# Patient Record
Sex: FEMALE | Race: WHITE | NOT HISPANIC OR LATINO | Employment: UNEMPLOYED | URBAN - METROPOLITAN AREA
[De-identification: names, ages, dates, MRNs, and addresses within clinical notes are randomized per-mention and may not be internally consistent; named-entity substitution may affect disease eponyms.]

---

## 2024-02-21 ENCOUNTER — APPOINTMENT (OUTPATIENT)
Dept: PRIMARY CARE | Facility: CLINIC | Age: 24
End: 2024-02-21
Payer: COMMERCIAL

## 2024-02-21 ENCOUNTER — OFFICE VISIT (OUTPATIENT)
Dept: PRIMARY CARE | Facility: CLINIC | Age: 24
End: 2024-02-21
Payer: COMMERCIAL

## 2024-02-21 VITALS
SYSTOLIC BLOOD PRESSURE: 104 MMHG | HEART RATE: 72 BPM | WEIGHT: 247.4 LBS | HEIGHT: 61 IN | DIASTOLIC BLOOD PRESSURE: 60 MMHG | TEMPERATURE: 97.5 F | OXYGEN SATURATION: 99 % | BODY MASS INDEX: 46.71 KG/M2

## 2024-02-21 DIAGNOSIS — Z00.00 ANNUAL PHYSICAL EXAM: Primary | ICD-10-CM

## 2024-02-21 DIAGNOSIS — K59.04 CHRONIC IDIOPATHIC CONSTIPATION: ICD-10-CM

## 2024-02-21 DIAGNOSIS — R39.15 URGENCY OF URINATION: ICD-10-CM

## 2024-02-21 PROBLEM — E66.01 OBESITY, CLASS III, BMI 40-49.9 (MORBID OBESITY) (MULTI): Status: ACTIVE | Noted: 2019-10-29

## 2024-02-21 PROBLEM — E66.813 OBESITY, CLASS III, BMI 40-49.9 (MORBID OBESITY): Status: ACTIVE | Noted: 2019-10-29

## 2024-02-21 LAB
POC APPEARANCE, URINE: CLEAR
POC BILIRUBIN, URINE: NEGATIVE
POC BLOOD, URINE: NEGATIVE
POC COLOR, URINE: YELLOW
POC GLUCOSE, URINE: NEGATIVE MG/DL
POC KETONES, URINE: NEGATIVE MG/DL
POC LEUKOCYTES, URINE: NEGATIVE
POC NITRITE,URINE: NEGATIVE
POC PH, URINE: 6 PH
POC PROTEIN, URINE: NEGATIVE MG/DL
POC SPECIFIC GRAVITY, URINE: >=1.03
POC UROBILINOGEN, URINE: 0.2 EU/DL

## 2024-02-21 PROCEDURE — 81002 URINALYSIS NONAUTO W/O SCOPE: CPT | Performed by: NURSE PRACTITIONER

## 2024-02-21 PROCEDURE — 99395 PREV VISIT EST AGE 18-39: CPT | Performed by: NURSE PRACTITIONER

## 2024-02-21 PROCEDURE — 1036F TOBACCO NON-USER: CPT | Performed by: NURSE PRACTITIONER

## 2024-02-21 PROCEDURE — 3008F BODY MASS INDEX DOCD: CPT | Performed by: NURSE PRACTITIONER

## 2024-02-21 RX ORDER — NORETHINDRONE ACETATE/ETHINYL ESTRADIOL 1MG-20MCG
1 KIT ORAL DAILY
COMMUNITY

## 2024-02-21 ASSESSMENT — PATIENT HEALTH QUESTIONNAIRE - PHQ9
2. FEELING DOWN, DEPRESSED OR HOPELESS: NOT AT ALL
SUM OF ALL RESPONSES TO PHQ9 QUESTIONS 1 AND 2: 0
1. LITTLE INTEREST OR PLEASURE IN DOING THINGS: NOT AT ALL

## 2024-02-21 ASSESSMENT — PAIN SCALES - GENERAL: PAINLEVEL: 0-NO PAIN

## 2024-02-21 NOTE — PROGRESS NOTES
"Subjective   Patient ID: Ashley Carty is a 23 y.o. female who presents for Annual Exam (Not fasting).    HPI  This is a pleasant 24 y/o female with PMH Obesity who presents to [facility] care and for Annual exam  PCP Dr Delgadillo 2021    Current concerns include  Reports she and her  feel it is time to get healthy, lose weight    Dizzy- reports she was dizzy on Monday, fell, did not lose consciousness, no injury. Has not happened prior or since. No associated chest pain, sob, vision changes, weakness. Thinks she was doing to much and this caused her to fall    Thyroid?- reports had labs drawn and was told there was something\"off with her thyroid\" but did not follow up, no TSH noted in chart, will schedule draw. Denies palpitation, no weight changes, endorses constipation, denies brain fog, dry skin    Overflow incontinence-endorses stress incontinence with sneeze or laugh, at times needs to rush to get to the bathroom. Denies dysuria, fullness, hesitancy, endorses constipation. States her Ob told her it was from childbirth    Chronic constipation- BM every few days, strains often but not every time, has noted blood on the tissue at times, not in bowel, denies pain.     Diet \"could be better  B skips or Nutra grain bar  L sandwich, fruit  D pasta  Caffeine monster a few times a month  Water 2 cups    Exercise no  Non-smoker  Alcohol  No       FT MA student at [employer]   3 Kids ages 4, 1, 3 months    Eye exam 2023  Dental exam >5  Gyn no abnormal pap 2023    Parents healthy  Sister with HTN    Review of Systems  Review of Systems   Constitutional: HPI  HENT: Negative.     Respiratory: Negative.     Cardiovascular: Negative.    Gastrointestinal: HPI   Genitourinary:HPI  Musculoskeletal: Negative.    Psychiatric/Behavioral: Negative.     All other systems reviewed and are negative.    .vsVisit Vitals  /60 (BP Location: Left arm, Patient Position: Sitting)   Pulse 72   Temp 36.4 °C (97.5 °F)   Ht " "1.549 m (5' 1\")   Wt 112 kg (247 lb 6.4 oz)   LMP 02/15/2024 (Exact Date)   SpO2 99%   BMI 46.75 kg/m²   OB Status Having periods   Smoking Status Never   BSA 2.2 m²         Objective   Physical Exam  Physical Exam  Vitals reviewed.   Constitutional:       General: She is active.   HENT:      Head: Normocephalic and atraumatic.   Eyes:      Extraocular Movements: Extraocular movements intact.      Pupils: Pupils are equal, round, and reactive to light.   Cardiovascular:      Rate and Rhythm: Normal rate and regular rhythm.   Pulmonary:      Effort: Pulmonary effort is normal.      Breath sounds: Normal breath sounds.   Abdominal:      General: Bowel sounds are normal.   Musculoskeletal:         General: Normal range of motion.      Cervical back: Neck supple.   Skin:     General: Skin is warm and dry.      Capillary Refill: Capillary refill takes less than 2 seconds.   Neurological:      General: No focal deficit present.      Mental Status: She is alert.     Assessment/Plan   Problem List Items Addressed This Visit       Annual physical exam - Primary     Obtain labs at work or can return to office         Relevant Orders    CBC    Comprehensive Metabolic Panel    Vitamin D 25-Hydroxy,Total (for eval of Vitamin D levels)    TSH with reflex to Free T4 if abnormal    Hemoglobin A1C    Lipid Panel    Chronic idiopathic constipation     Increase fiber, fluids         Obesity, Class III, BMI 40-49.9 (morbid obesity) (CMS/[facility])     Reviewed diet, exercise  Handouts provided and reviewed with patient, discussed  Nutrition referral           Urgency of urination    Relevant Orders    POCT UA (nonautomated) manually resulted (Completed)          "

## 2024-02-21 NOTE — ASSESSMENT & PLAN NOTE
Reviewed diet, exercise  Handouts provided and reviewed with patient, discussed  Nutrition referral

## 2024-02-21 NOTE — PATIENT INSTRUCTIONS
Labs will be released to My Chart or if you are not connected you will be notified of abnormals only    Return for Fasting Labs  Nothing to eat or drink for 10 hours. Black coffee, black tea or water is ok    Constipation  Though occasional constipation is very common, some people experience chronic constipation that can interfere with their ability to go about their daily tasks.   Chronic constipation may also cause people to strain excessively in order to have a bowel movement.    Symptoms    Signs and symptoms of chronic constipation include:  Passing fewer than three stools a week   Having lumpy or hard stools   Straining to have bowel movements   Feeling as though there's a blockage in your rectum that prevents bowel movements   Feeling as though you can't completely empty the stool from your rectum   Needing help to empty your rectum, such as using your hands to press on your abdomen and using a finger to remove stool from your rectum  Constipation may be considered chronic if you've experienced two or more of these symptoms for the last three months.    Risk factors    Being an older adult   Being a woman   Being dehydrated   Eating a diet that's low in fiber   Getting little or no physical activity   Taking certain medications, including sedatives, opioid pain medications, some antidepressants or medications to lower blood pressure   Having a mental health condition such as depression or an eating disorder    Prevention    Include plenty of high-fiber foods in your diet, including beans, vegetables, fruits, whole grain cereals and bran.   Eat fewer foods with low amounts of fiber such as processed foods, and dairy and meat products.   Drink plenty of fluids.   Stay as active as possible and try to get regular exercise.   Try to manage stress.   Don't ignore the urge to pass stool.   Try to create a regular schedule for bowel movements, especially after a meal.   Make sure children who begin to eat solid  foods get plenty of fiber in their diets.    Health Tips for People in their 20's     Develop good health habits now  Don't put it off. With good health habits, you can prevent or reduce the likelihood of health-impacting conditions later in life, such as obesity, high blood pressure, heart disease, or diabetes. Establishing good health habits now is easier than having to begin these practices later on, or to have to break bad habits.      Establish a relationship with a primary care provider   A PCP can help you on your health journey. When you see a provider on a regular basis, you're more likely to feel comfortable about talking about sensitive issues as well as being receptive to the advice your provider offers, because you develop a trusting relationship. And by getting to know you over time, your doctor may be better able to  on signs of health concerns.      Know your family health history   Knowing your family's health history can help you and your primary care provider better manage your health - and be aware of potential hereditary risks to be watching for.  Things like migraines or even family members with certain cancers and heart attack can increase your risk.       Get regular health screenings and Keep up with immunizations. This includes the HPV vaccine, for men and women.   For women: Monthly Self breast exams, See Gynecology for a Pap smear; HPV screening for the virus that causes genital warts, which can lead to cervical cancer   For men: Monthly Self testicular exams.   For both: sexually transmitted disease testing, if sexually active. Remember to use birth control to prevent and to protect. Talk with your health care provider about the screening schedule that's best for you.    Have good for you people in your life  Its all about a few good friends over a lot of not so good friends. If you are close to your family stay that way, if not then develop new relationships that help you to grow  and thrive. Often people make friends at work, Rast[employer]arian, community groups  Developing and maintaining a work-life balance that allows room for friendships and relationships can have a positive impact on your mental and emotional health.     Be a numbers person  Keep tabs on numbers that affect your health, like weight, blood pressure, the amount of calories you consume, and cholesterol. Your health care provider can help you with this.      Pay attention to the risk of a few extra pounds.  If you gain four or five pounds every year, it doesn't seem like a lot, but at the end of 10 years, you've added 40 or 50 pounds - and in 15 to 20 years, you have 75 to 100 extra pounds that you're carrying!  *Choose a life of healthful eating over trendy diets. The more effective approach: adopt a life-time practice of eating a balanced, nutritional diet that includes vegetables, fruits, lean meats, whole grains, low-fat dairy, nuts and legumes, and non-tropical vegetable oils. And limit sweets.   *Practice portion control. There's more to healthy eating than choosing nutritious food. There's also limiting how much you eat, even if you're eating incredibly healthy food *Remember, your metabolic rate slows as you age. That is, your body becomes less efficient in burning calories.     Stay active   If you're exercising on a regular basis, that is going to help with a lot of health problems that are related to lifestyle.  You don't have to be an athlete, start with a walking program. Even 10 minutes of good exercise is beneficial. Don't forget weight training. Any Weights 3 days a week maintains bone health and helps to burn calories    Get enough sleep  For most that means seven to nine hours a night.   Sleep affects your ability to learn new information and to memorize and process information. Reaction time is adversely affected by too little sleep (a big safety risk similar to drinking alcohol). In the long run, sleep makes a big  difference in how you function and succeed     Mood impacts your overall health  People who are struggling with depression, anxiety, and self-esteem issues really have a lot of difficulty with their health. When depressed, you may not be motivated and may not see the value of taking care of your health. Self Care, Exercise and friendships can help reduce your risk of mental and emotional health issues, and when you need it, your health care provider can help you get professional help.      Don't vape  Vaping is a big problem ,it's not just flavored water, nicotine comes from tobacco so you are in essence still smoking. Also, we don't know about the effects of what's in vaping cartridges, and sometimes they're modified with substances that can cause lung failure. Cigarettes are even worse with known cancer causing chemicals  2 ml of vape juice is equal to 1 pack of cigarettes    Think twice about marijuana  Even in states where marijuana is legal (medically or recreationally) it doesn't mean your employer is going to think it's OK.   Like alcohol, marijuana affects your reasoning, decision-making, and ability to operate equipment safely

## 2024-02-26 ENCOUNTER — CLINICAL SUPPORT (OUTPATIENT)
Dept: PRIMARY CARE | Facility: CLINIC | Age: 24
End: 2024-02-26
Payer: COMMERCIAL

## 2024-02-26 DIAGNOSIS — Z00.00 ANNUAL PHYSICAL EXAM: ICD-10-CM

## 2024-02-26 PROCEDURE — 80061 LIPID PANEL: CPT

## 2024-02-26 PROCEDURE — 80053 COMPREHEN METABOLIC PANEL: CPT

## 2024-02-26 PROCEDURE — 36415 COLL VENOUS BLD VENIPUNCTURE: CPT

## 2024-02-26 PROCEDURE — 84443 ASSAY THYROID STIM HORMONE: CPT

## 2024-02-26 PROCEDURE — 85027 COMPLETE CBC AUTOMATED: CPT

## 2024-02-26 PROCEDURE — 82306 VITAMIN D 25 HYDROXY: CPT

## 2024-02-26 PROCEDURE — 83036 HEMOGLOBIN GLYCOSYLATED A1C: CPT

## 2024-02-26 NOTE — PROGRESS NOTES
Patient came in today for fasting blood draw. She 23g butterfly needle in left arm. Tolerated it well.

## 2024-02-27 DIAGNOSIS — E55.9 VITAMIN D DEFICIENCY: Primary | ICD-10-CM

## 2024-02-27 LAB
25(OH)D3 SERPL-MCNC: 15 NG/ML (ref 30–100)
ALBUMIN SERPL BCP-MCNC: 3.9 G/DL (ref 3.4–5)
ALP SERPL-CCNC: 77 U/L (ref 33–110)
ALT SERPL W P-5'-P-CCNC: 22 U/L (ref 7–45)
ANION GAP SERPL CALC-SCNC: 14 MMOL/L (ref 10–20)
AST SERPL W P-5'-P-CCNC: 19 U/L (ref 9–39)
BILIRUB SERPL-MCNC: 0.3 MG/DL (ref 0–1.2)
BUN SERPL-MCNC: 12 MG/DL (ref 6–23)
CALCIUM SERPL-MCNC: 9.2 MG/DL (ref 8.6–10.6)
CHLORIDE SERPL-SCNC: 105 MMOL/L (ref 98–107)
CHOLEST SERPL-MCNC: 206 MG/DL (ref 0–199)
CHOLESTEROL/HDL RATIO: 4.7
CO2 SERPL-SCNC: 26 MMOL/L (ref 21–32)
CREAT SERPL-MCNC: 0.69 MG/DL (ref 0.5–1.05)
EGFRCR SERPLBLD CKD-EPI 2021: >90 ML/MIN/1.73M*2
ERYTHROCYTE [DISTWIDTH] IN BLOOD BY AUTOMATED COUNT: 13.4 % (ref 11.5–14.5)
EST. AVERAGE GLUCOSE BLD GHB EST-MCNC: 85 MG/DL
GLUCOSE SERPL-MCNC: 90 MG/DL (ref 74–99)
HBA1C MFR BLD: 4.6 %
HCT VFR BLD AUTO: 44.2 % (ref 36–46)
HDLC SERPL-MCNC: 44.1 MG/DL
HGB BLD-MCNC: 13.4 G/DL (ref 12–16)
LDLC SERPL CALC-MCNC: 124 MG/DL
MCH RBC QN AUTO: 28.3 PG (ref 26–34)
MCHC RBC AUTO-ENTMCNC: 30.3 G/DL (ref 32–36)
MCV RBC AUTO: 93 FL (ref 80–100)
NON HDL CHOLESTEROL: 162 MG/DL (ref 0–149)
NRBC BLD-RTO: 0 /100 WBCS (ref 0–0)
PLATELET # BLD AUTO: 290 X10*3/UL (ref 150–450)
POTASSIUM SERPL-SCNC: 4.6 MMOL/L (ref 3.5–5.3)
PROT SERPL-MCNC: 6.9 G/DL (ref 6.4–8.2)
RBC # BLD AUTO: 4.74 X10*6/UL (ref 4–5.2)
SODIUM SERPL-SCNC: 140 MMOL/L (ref 136–145)
TRIGL SERPL-MCNC: 190 MG/DL (ref 0–149)
TSH SERPL-ACNC: 1.04 MIU/L (ref 0.44–3.98)
VLDL: 38 MG/DL (ref 0–40)
WBC # BLD AUTO: 12.1 X10*3/UL (ref 4.4–11.3)

## 2024-02-27 RX ORDER — ERGOCALCIFEROL 1.25 MG/1
50000 CAPSULE ORAL
Qty: 4 CAPSULE | Refills: 1 | Status: SHIPPED | OUTPATIENT
Start: 2024-02-27 | End: 2024-04-23

## 2024-05-23 ENCOUNTER — OFFICE VISIT (OUTPATIENT)
Dept: PRIMARY CARE | Facility: CLINIC | Age: 24
End: 2024-05-23
Payer: COMMERCIAL

## 2024-05-23 VITALS
WEIGHT: 246 LBS | BODY MASS INDEX: 46.48 KG/M2 | TEMPERATURE: 98.4 F | DIASTOLIC BLOOD PRESSURE: 60 MMHG | OXYGEN SATURATION: 98 % | SYSTOLIC BLOOD PRESSURE: 100 MMHG

## 2024-05-23 DIAGNOSIS — R39.15 URGENCY OF URINATION: ICD-10-CM

## 2024-05-23 DIAGNOSIS — E66.01 OBESITY, CLASS III, BMI 40-49.9 (MORBID OBESITY) (MULTI): Primary | ICD-10-CM

## 2024-05-23 DIAGNOSIS — E55.9 VITAMIN D DEFICIENCY: ICD-10-CM

## 2024-05-23 DIAGNOSIS — K59.04 CHRONIC IDIOPATHIC CONSTIPATION: ICD-10-CM

## 2024-05-23 PROCEDURE — 1036F TOBACCO NON-USER: CPT | Performed by: NURSE PRACTITIONER

## 2024-05-23 PROCEDURE — 3008F BODY MASS INDEX DOCD: CPT | Performed by: NURSE PRACTITIONER

## 2024-05-23 PROCEDURE — 99213 OFFICE O/P EST LOW 20 MIN: CPT | Performed by: NURSE PRACTITIONER

## 2024-05-23 ASSESSMENT — PAIN SCALES - GENERAL: PAINLEVEL: 0-NO PAIN

## 2024-05-23 ASSESSMENT — ENCOUNTER SYMPTOMS: DEPRESSION: 0

## 2024-05-23 NOTE — PATIENT INSTRUCTIONS
Vitamin D  you may supplement it with an over the counter Vitamin D supplement, 2000 units daily or a Multi Vitamin with D. Look for the USP label so you know the supplement is verified to be pure and not fillers.   If you chooses not to take a supplement look for fortified foods like OJ, Soy milk, Cereal or natural sources Egg yolks, [place], Canned tuna, mushrooms, Sardines,      A vitamin D deficiency can show symptoms such as:     Low immunity, frequent coughs and colds  Chronic fatigue  Frequent headaches  Fractures and falls  Periodontal disease  Muscle pains   Joint pains  Hypertension  Slow healing of wounds  Hair loss  Asthma  Recurring infections  Rickets in children  Osteoporosis in the elderly  Osteomalacia (soft bones)     Not many know that calcium and vitamin D3 in fact, work hand-in-hand to protect the health of your bones. While calcium works to build and maintain bones, it is the role of vitamin D to assist your body in effectively absorbing this calcium. Whether you're consuming the required amount of calcium for your body is thus not enough, as it could be futile if you have a vitamin D deficiency. For this reason, many calcium supplements also have vitamin D in them that aid in absorption.     Vitamin D supplements         It is important to note that unlike vitamin C, there are no vitamin D vegetables and fruits.      Some of the supplements and sources of vitamin D include:     Seafood and fatty fish  At the top of the list of vitamin D3 foods are these fatty fish and seafood such as salmon, tuna, herring, sardines, mackerel, oysters, and shrimp.      Fortified foods  As there aren't many naturally-occurring vitamin D foods, certain items are often fortified with this vitamin -meaning vitamin D has purposefully been added to them. These vitamin D containing foods include cow's milk, cheese, cereals, yogurts and curd, plant-based milk alternatives such as soy and almond milks, and tofu. There  are also no vitamin D fruits in nature, but orange juice is often fortified with it as well.     Egg yolks  While many tend to avoid the yellow or yolk of the egg, it is this part that is actually one of the best vitamin D sources around. Eggs from free-range or pasture-raised chickens tend to offer more vitamin D.     Cod liver oil  This is a good alternative if you don't enjoy consuming fish directly. Cod liver oil is available in capsule form and is well-known vitamin D supplement, that is also rich in vitamin A and omega-3 fatty acids.    Mushrooms  There are no vitamin D vegetables except mushrooms, which are the only naturally-available vegetarian source of this vitamin. Interestingly, just like humans, mushrooms also produce their own vitamin D when exposed to UV light.

## 2024-05-23 NOTE — PROGRESS NOTES
Subjective   Patient ID: Ashley Felder is a 23 y.o. female who presents for Follow-up.    HPI   This is a pleasant 22 y/o female with PMH Obesity who presents for follow up  Overflow incontinence-endorses stress incontinence with sneeze or laugh, often needs to rush to get to the bathroom. Advised to decrease caffeine, limit citrus at last visit, reports less caffeine, thinking about eliminating, no change in symptoms. Refer to [facility] for pelvic therapy which we discussed     Chronic constipation- Has increased fiber, more fruits and vegetables with good results    BMI 46- she and spouse wanting to get healthier, her family members have diabetes, most recently dad diagnosed. Referred to Nutrition but not yet scheduled. Encouraged to do so. Labs reviewed as well as healthy lifestyle, reviewed lipid levels. She is walking more, 20 minutes a day with kids, brisk walk. Weight down 1#, may be interested in medications in the future    Vitamin D def- completed supplement.  Handouts provided and reviewed with patient, discussed       Review of Systems  Review of Systems   Constitutional: Negative.    HENT: Negative.     Respiratory: Negative.     Cardiovascular: Negative.    Gastrointestinal: HPI  Genitourinary: HPI   All other systems reviewed and are negative.    Objective   /60 (BP Location: Left arm, Patient Position: Sitting)   Temp 36.9 °C (98.4 °F)   Wt 112 kg (246 lb)   LMP 05/20/2024   SpO2 98%   BMI 46.48 kg/m²     Physical Exam  Physical Exam  Vitals reviewed.   Constitutional:       General: She is active.   HENT:      Head: Normocephalic and atraumatic.   Cardiovascular:      Rate and Rhythm: Normal rate and regular rhythm.   Pulmonary:      Effort: Pulmonary effort is normal.      Breath sounds: Normal breath sounds.   Musculoskeletal:         General: Normal range of motion.      Cervical back: Neck supple.   Skin:     General: Skin is warm and dry.      Neurological:      General: No focal  deficit present.      Mental Status: She is alert.     Assessment/Plan   Problem List Items Addressed This Visit             ICD-10-CM    Chronic idiopathic constipation K59.04     increased fiber with good results         Obesity, Class III, BMI 40-49.9 (morbid obesity) (Multi) - Primary E66.01     Referred to Nutrition but not yet scheduled  Reviewed plate method         Urgency of urination R39.15     Refer to [facility] for pelvic therapy   Avoid bladder irritants, Handouts sent to My Chart           Vitamin D deficiency E55.9     Supplemented  Handouts provided and reviewed with patient, discussed

## 2024-07-22 ENCOUNTER — HOSPITAL ENCOUNTER (OUTPATIENT)
Dept: RADIOLOGY | Facility: CLINIC | Age: 24
Discharge: HOME | End: 2024-07-22
Payer: COMMERCIAL

## 2024-07-22 ENCOUNTER — OFFICE VISIT (OUTPATIENT)
Dept: PRIMARY CARE | Facility: CLINIC | Age: 24
End: 2024-07-22
Payer: COMMERCIAL

## 2024-07-22 VITALS
BODY MASS INDEX: 45.31 KG/M2 | OXYGEN SATURATION: 98 % | WEIGHT: 240 LBS | HEART RATE: 72 BPM | DIASTOLIC BLOOD PRESSURE: 84 MMHG | TEMPERATURE: 96.4 F | SYSTOLIC BLOOD PRESSURE: 134 MMHG | HEIGHT: 61 IN

## 2024-07-22 DIAGNOSIS — M79.671 PAIN OF MIDFOOT, RIGHT: ICD-10-CM

## 2024-07-22 DIAGNOSIS — M79.671 PAIN OF MIDFOOT, RIGHT: Primary | ICD-10-CM

## 2024-07-22 PROCEDURE — 99213 OFFICE O/P EST LOW 20 MIN: CPT | Performed by: FAMILY MEDICINE

## 2024-07-22 PROCEDURE — 3008F BODY MASS INDEX DOCD: CPT | Performed by: FAMILY MEDICINE

## 2024-07-22 PROCEDURE — 73610 X-RAY EXAM OF ANKLE: CPT | Mod: RT

## 2024-07-22 PROCEDURE — 1036F TOBACCO NON-USER: CPT | Performed by: FAMILY MEDICINE

## 2024-07-22 PROCEDURE — 73610 X-RAY EXAM OF ANKLE: CPT | Mod: RIGHT SIDE | Performed by: RADIOLOGY

## 2024-07-22 PROCEDURE — 73630 X-RAY EXAM OF FOOT: CPT | Mod: RT

## 2024-07-22 PROCEDURE — 73630 X-RAY EXAM OF FOOT: CPT | Mod: RIGHT SIDE | Performed by: RADIOLOGY

## 2024-07-22 ASSESSMENT — PATIENT HEALTH QUESTIONNAIRE - PHQ9
2. FEELING DOWN, DEPRESSED OR HOPELESS: NOT AT ALL
1. LITTLE INTEREST OR PLEASURE IN DOING THINGS: NOT AT ALL
SUM OF ALL RESPONSES TO PHQ9 QUESTIONS 1 & 2: 0

## 2024-07-22 NOTE — PROGRESS NOTES
"23-year-old female complaining of midfoot pain since twisting her foot 3 weeks ago without swelling or deformity.  Also complains of some ankle pain         /84   Pulse 72   Temp 35.8 °C (96.4 °F)   Ht 1.549 m (5' 1\")   Wt 109 kg (240 lb)   SpO2 98%   BMI 45.35 kg/m²     Right lower extremity:  Calf nontender without erythema or cord   Achilles intact  Dorsalis pedis pulse 2+  Midfoot tenderness without deformity  No ecchymosis  Medial lateral malleolus nontender without ecchymosis or ankle effusion  Negative drawer  Foot dorsiflexion foot plantarflexion 5 out of 5 bilaterally      "

## 2024-07-30 ENCOUNTER — TELEPHONE (OUTPATIENT)
Dept: PRIMARY CARE | Facility: CLINIC | Age: 24
End: 2024-07-30
Payer: COMMERCIAL

## 2024-07-30 NOTE — TELEPHONE ENCOUNTER
Patient was called to check her status of how she is feeling. She states her ankle is getting better and feels better . She has CPE 8/1/24 and will touch base with the provider then.

## 2024-07-30 NOTE — TELEPHONE ENCOUNTER
----- Message from Lucy Bocanegra sent at 7/30/2024 10:00 AM EDT -----  Please give her a call and see how she is doing

## 2024-08-01 ENCOUNTER — APPOINTMENT (OUTPATIENT)
Dept: PRIMARY CARE | Facility: CLINIC | Age: 24
End: 2024-08-01
Payer: COMMERCIAL

## 2024-08-01 VITALS
OXYGEN SATURATION: 98 % | SYSTOLIC BLOOD PRESSURE: 120 MMHG | WEIGHT: 252 LBS | HEART RATE: 79 BPM | DIASTOLIC BLOOD PRESSURE: 70 MMHG | HEIGHT: 61 IN | TEMPERATURE: 97.3 F | BODY MASS INDEX: 47.58 KG/M2

## 2024-08-01 DIAGNOSIS — R39.15 URGENCY OF URINATION: Primary | ICD-10-CM

## 2024-08-01 DIAGNOSIS — E66.01 OBESITY, CLASS III, BMI 40-49.9 (MORBID OBESITY) (MULTI): ICD-10-CM

## 2024-08-01 PROCEDURE — 3008F BODY MASS INDEX DOCD: CPT | Performed by: NURSE PRACTITIONER

## 2024-08-01 PROCEDURE — 99213 OFFICE O/P EST LOW 20 MIN: CPT | Performed by: NURSE PRACTITIONER

## 2024-08-01 ASSESSMENT — ENCOUNTER SYMPTOMS
MUSCULOSKELETAL NEGATIVE: 1
CARDIOVASCULAR NEGATIVE: 1
GASTROINTESTINAL NEGATIVE: 1
NEUROLOGICAL NEGATIVE: 1
ENDOCRINE NEGATIVE: 1
RESPIRATORY NEGATIVE: 1
ALLERGIC/IMMUNOLOGIC NEGATIVE: 1
PSYCHIATRIC NEGATIVE: 1
HEMATOLOGIC/LYMPHATIC NEGATIVE: 1
EYES NEGATIVE: 1
DEPRESSION: 0
CONSTITUTIONAL NEGATIVE: 1

## 2024-08-01 ASSESSMENT — PATIENT HEALTH QUESTIONNAIRE - PHQ9
SUM OF ALL RESPONSES TO PHQ9 QUESTIONS 1 AND 2: 0
1. LITTLE INTEREST OR PLEASURE IN DOING THINGS: NOT AT ALL
2. FEELING DOWN, DEPRESSED OR HOPELESS: NOT AT ALL

## 2024-08-01 ASSESSMENT — PAIN SCALES - GENERAL: PAINLEVEL: 0-NO PAIN

## 2024-08-01 NOTE — PATIENT INSTRUCTIONS
Follow up with Nutritionist and [facility] Tips for People in their 20's     Develop good health habits now  Don't put it off. With good health habits, you can prevent or reduce the likelihood of health-impacting conditions later in life, such as obesity, high blood pressure, heart disease, or diabetes. Establishing good health habits now is easier than having to begin these practices later on, or to have to break bad habits.      Establish a relationship with a primary care provider   A PCP can help you on your health journey. When you see a provider on a regular basis, you're more likely to feel comfortable about talking about sensitive issues as well as being receptive to the advice your provider offers, because you develop a trusting relationship. And by getting to know you over time, your doctor may be better able to  on signs of health concerns.      Know your family health history   Knowing your family's health history can help you and your primary care provider better manage your health - and be aware of potential hereditary risks to be watching for.  Things like migraines or even family members with certain cancers and heart attack can increase your risk.       Get regular health screenings and Keep up with immunizations. This includes the HPV vaccine, for men and women.   For women: Monthly Self breast exams, See Gynecology for a Pap smear; HPV screening for the virus that causes genital warts, which can lead to cervical cancer   For men: Monthly Self testicular exams.   For both: sexually transmitted disease testing, if sexually active. Remember to use birth control to prevent and to protect. Talk with your health care provider about the screening schedule that's best for you.    Have good for you people in your life  Its all about a few good friends over a lot of not so good friends. If you are close to your family stay that way, if not then develop new relationships that help you to grow  and thrive. Often people make friends at work, [employer], community groups  Developing and maintaining a work-life balance that allows room for friendships and relationships can have a positive impact on your mental and emotional health.     Be a numbers person  Keep tabs on numbers that affect your health, like weight, blood pressure, the amount of calories you consume, and cholesterol. Your health care provider can help you with this.      Pay attention to the risk of a few extra pounds.  If you gain four or five pounds every year, it doesn't seem like a lot, but at the end of 10 years, you've added 40 or 50 pounds - and in 15 to 20 years, you have 75 to 100 extra pounds that you're carrying!  *Choose a life of healthful eating over trendy diets. The more effective approach: adopt a life-time practice of eating a balanced, nutritional diet that includes vegetables, fruits, lean meats, whole grains, low-fat dairy, nuts and legumes, and non-tropical vegetable oils. And limit sweets.   *Practice portion control. There's more to healthy eating than choosing nutritious food. There's also limiting how much you eat, even if you're eating incredibly healthy food *Remember, your metabolic rate slows as you age. That is, your body becomes less efficient in burning calories.     Stay active   If you're exercising on a regular basis, that is going to help with a lot of health problems that are related to lifestyle.  You don't have to be an athlete, start with a walking program. Even 10 minutes of good exercise is beneficial. Don't forget weight training. Any Weights 3 days a week maintains bone health and helps to burn calories    Get enough sleep  For most that means seven to nine hours a night.   Sleep affects your ability to learn new information and to memorize and process information. Reaction time is adversely affected by too little sleep (a big safety risk similar to drinking alcohol). In the long run, sleep makes a big  difference in how you function and succeed     Mood impacts your overall health  People who are struggling with depression, anxiety, and self-esteem issues really have a lot of difficulty with their health. When depressed, you may not be motivated and may not see the value of taking care of your health. Self Care, Exercise and friendships can help reduce your risk of mental and emotional health issues, and when you need it, your health care provider can help you get professional help.      Don't vape  Vaping is a big problem ,it's not just flavored water, nicotine comes from tobacco so you are in essence still smoking. Also, we don't know about the effects of what's in vaping cartridges, and sometimes they're modified with substances that can cause lung failure. Cigarettes are even worse with known cancer causing chemicals  2 ml of vape juice is equal to 1 pack of cigarettes    Think twice about marijuana  Even in states where marijuana is legal (medically or recreationally) it doesn't mean your employer is going to think it's OK.   Like alcohol, marijuana affects your reasoning, decision-making, and ability to operate equipment safely

## 2024-08-01 NOTE — PROGRESS NOTES
"Subjective   Patient ID: Ashley Felder is a 24 y.o. female who presents for Follow-up (Pt here for follow up).    HPI   Pleasant 25 y/o female with a PMH of obesity, vitamin D deficiency, constipation, and urgency of urination who presents today for a follow up visit.     Current Concerns  Weight - up 12 #, Reports Improving diet and increasing water intake. Educated on exercising and moving more, Scheduling nutritionist.    Urgency of urination - Patient states she was unable to make it to pelvic floor therapy session. Has been doing her own exercises she finds on youtube. Recommended to follow up with pelvic floor therapy session.     Review of Systems   Constitutional: Negative.    HENT: Negative.     Eyes: Negative.    Respiratory: Negative.     Cardiovascular: Negative.    Gastrointestinal: Negative.    Endocrine: Negative.    Genitourinary: Negative.    Musculoskeletal: Negative.    Skin: Negative.    Allergic/Immunologic: Negative.    Neurological: Negative.    Hematological: Negative.    Psychiatric/Behavioral: Negative.     All other systems reviewed and are negative.      Objective   /70 (BP Location: Left arm, Patient Position: Sitting)   Pulse 79   Temp 36.3 °C (97.3 °F)   Ht 1.549 m (5' 1\")   Wt 114 kg (252 lb)   LMP 08/01/2024   SpO2 98%   BMI 47.61 kg/m²     Physical Exam  Vitals reviewed.   Constitutional:       Appearance: She is obese.   HENT:      Head: Normocephalic and atraumatic.   Cardiovascular:      Rate and Rhythm: Normal rate and regular rhythm.      Heart sounds: Normal heart sounds.   Pulmonary:      Effort: Pulmonary effort is normal.      Breath sounds: Normal breath sounds.   Neurological:      Mental Status: She is alert and oriented to person, place, and time. Mental status is at baseline.   Psychiatric:         Mood and Affect: Mood normal.         Behavior: Behavior normal.         Thought Content: Thought content normal.         Judgment: Judgment normal. "         Assessment/Plan   Problem List Items Addressed This Visit             ICD-10-CM    Obesity, Class III, BMI 40-49.9 (morbid obesity) (Multi) E66.01     Follow up with nutritionist. Continue to eat healthy and increase water intake.          Urgency of urination - Primary R39.15     Follow up with PT for pelvic floor therapy.           I was present with the APRN student who participated in the documentation of this note. I have personally seen and re-examined the patient and performed the medical decision-making components (assessment and plan of care). I have reviewed the APRN student documentation and verified the findings in the note as written with additions or exceptions as stated in the body of this note  Lucy Bocanegra [facility]

## 2024-09-05 ENCOUNTER — CLINICAL SUPPORT (OUTPATIENT)
Dept: PRIMARY CARE | Facility: CLINIC | Age: 24
End: 2024-09-05
Payer: COMMERCIAL

## 2024-09-05 DIAGNOSIS — Z23 NEED FOR INFLUENZA VACCINATION: ICD-10-CM

## 2024-09-05 DIAGNOSIS — Z23 NEED FOR VACCINATION: Primary | ICD-10-CM

## 2024-09-05 PROCEDURE — 90471 IMMUNIZATION ADMIN: CPT | Performed by: FAMILY MEDICINE

## 2024-09-05 PROCEDURE — 90656 IIV3 VACC NO PRSV 0.5 ML IM: CPT | Performed by: FAMILY MEDICINE

## 2024-10-15 ENCOUNTER — LAB (OUTPATIENT)
Dept: LAB | Facility: LAB | Age: 24
End: 2024-10-15
Payer: COMMERCIAL

## 2024-10-15 ENCOUNTER — TELEMEDICINE (OUTPATIENT)
Dept: PRIMARY CARE | Facility: CLINIC | Age: 24
End: 2024-10-15
Payer: COMMERCIAL

## 2024-10-15 DIAGNOSIS — R10.2 PELVIC PRESSURE IN FEMALE: ICD-10-CM

## 2024-10-15 DIAGNOSIS — R10.2 PELVIC PRESSURE IN FEMALE: Primary | ICD-10-CM

## 2024-10-15 LAB
APPEARANCE UR: CLEAR
BILIRUB UR STRIP.AUTO-MCNC: NEGATIVE MG/DL
COLOR UR: YELLOW
GLUCOSE UR STRIP.AUTO-MCNC: NORMAL MG/DL
KETONES UR STRIP.AUTO-MCNC: NEGATIVE MG/DL
LEUKOCYTE ESTERASE UR QL STRIP.AUTO: ABNORMAL
MUCOUS THREADS #/AREA URNS AUTO: NORMAL /LPF
NITRITE UR QL STRIP.AUTO: NEGATIVE
PH UR STRIP.AUTO: 5.5 [PH]
PROT UR STRIP.AUTO-MCNC: NEGATIVE MG/DL
RBC # UR STRIP.AUTO: NEGATIVE /UL
RBC #/AREA URNS AUTO: NORMAL /HPF
SP GR UR STRIP.AUTO: 1.02
SQUAMOUS #/AREA URNS AUTO: NORMAL /HPF
UROBILINOGEN UR STRIP.AUTO-MCNC: NORMAL MG/DL
WBC #/AREA URNS AUTO: NORMAL /HPF

## 2024-10-15 PROCEDURE — 87086 URINE CULTURE/COLONY COUNT: CPT

## 2024-10-15 PROCEDURE — 99214 OFFICE O/P EST MOD 30 MIN: CPT | Performed by: NURSE PRACTITIONER

## 2024-10-15 PROCEDURE — 81001 URINALYSIS AUTO W/SCOPE: CPT

## 2024-10-15 ASSESSMENT — ENCOUNTER SYMPTOMS
FLANK PAIN: 1
FREQUENCY: 0
NAUSEA: 0
SWEATS: 0
HEMATURIA: 0
VOMITING: 0
CHILLS: 0

## 2024-10-15 NOTE — PROGRESS NOTES
Subjective   Patient ID: Ashley Felder is a 24 y.o. female who presents for a Virtual Visit UTI.    UTI   This is a new problem. The current episode started in the past 7 days (3 days ago). The problem occurs intermittently. The problem has been gradually worsening. Quality: pelvic pressure. There has been no fever. There is No history of pyelonephritis. Associated symptoms include a discharge and flank pain. Pertinent negatives include no chills, frequency, hematuria, hesitancy, nausea, possible pregnancy, sweats, urgency or vomiting. Associated symptoms comments: Endorses discharge x 1, pelvic pressure comes and goes, intense at times  Denies STI, pregnancy  Recent needle stick at work, pregnancy test negative  Heavy periods having 2 x month taking OCP. She has tried nothing for the symptoms. There is no history of recurrent UTIs.       Review of Systems   Constitutional:  Negative for chills.   Gastrointestinal:  Negative for nausea and vomiting.   Genitourinary:  Positive for flank pain. Negative for frequency, hematuria, hesitancy and urgency.       Physical Exam not performed  E-visit questionnaire reviewed and discussed with patient.   All questions answered    Assessment/Plan   Problem List Items Addressed This Visit    None  Visit Diagnoses         Codes    Pelvic pressure in female    -  Primary R10.2    Relevant Orders    Urinalysis with Reflex Culture and Microscopic    POCT UA (nonautomated) manually resulted          Discussed with patient   Verbalized understanding, Teach back utilized  Recommend follow up with PCP in   week

## 2024-10-16 DIAGNOSIS — R39.15 URGENCY OF URINATION: Primary | ICD-10-CM

## 2024-10-16 LAB — HOLD SPECIMEN: NORMAL

## 2024-10-16 RX ORDER — NITROFURANTOIN 25; 75 MG/1; MG/1
100 CAPSULE ORAL 2 TIMES DAILY
Qty: 10 CAPSULE | Refills: 0 | Status: SHIPPED | OUTPATIENT
Start: 2024-10-16 | End: 2024-10-21

## 2024-10-17 LAB — BACTERIA UR CULT: NORMAL

## 2025-01-17 DIAGNOSIS — L30.9 DERMATITIS: Primary | ICD-10-CM

## 2025-01-17 RX ORDER — CEPHALEXIN 500 MG/1
500 CAPSULE ORAL 3 TIMES DAILY
Qty: 30 CAPSULE | Refills: 0 | Status: SHIPPED | OUTPATIENT
Start: 2025-01-17 | End: 2025-01-27

## 2025-01-17 RX ORDER — TRIAMCINOLONE ACETONIDE 1 MG/G
CREAM TOPICAL 2 TIMES DAILY
Qty: 30 G | Refills: 0 | Status: SHIPPED | OUTPATIENT
Start: 2025-01-17

## 2025-01-23 ENCOUNTER — APPOINTMENT (OUTPATIENT)
Dept: PRIMARY CARE | Facility: CLINIC | Age: 25
End: 2025-01-23
Payer: COMMERCIAL

## 2025-03-04 ENCOUNTER — APPOINTMENT (OUTPATIENT)
Dept: PRIMARY CARE | Facility: CLINIC | Age: 25
End: 2025-03-04
Payer: COMMERCIAL

## 2025-03-04 VITALS
BODY MASS INDEX: 47.01 KG/M2 | OXYGEN SATURATION: 98 % | HEIGHT: 61 IN | HEART RATE: 76 BPM | WEIGHT: 249 LBS | DIASTOLIC BLOOD PRESSURE: 78 MMHG | SYSTOLIC BLOOD PRESSURE: 107 MMHG

## 2025-03-04 DIAGNOSIS — Z00.00 PHYSICAL EXAM: Primary | ICD-10-CM

## 2025-03-04 PROCEDURE — 3008F BODY MASS INDEX DOCD: CPT | Performed by: FAMILY MEDICINE

## 2025-03-04 PROCEDURE — 99395 PREV VISIT EST AGE 18-39: CPT | Performed by: FAMILY MEDICINE

## 2025-03-04 ASSESSMENT — ENCOUNTER SYMPTOMS
EYES NEGATIVE: 1
CARDIOVASCULAR NEGATIVE: 1
NEUROLOGICAL NEGATIVE: 1
RESPIRATORY NEGATIVE: 1
CONSTITUTIONAL NEGATIVE: 1
MUSCULOSKELETAL NEGATIVE: 1
GASTROINTESTINAL NEGATIVE: 1

## 2025-03-04 NOTE — PROGRESS NOTES
Subjective   Patient ID: Ashley Felder is a 24 y.o. female who presents for Annual Exam.  HPI    Review of Systems   Constitutional: Negative.    HENT: Negative.     Eyes: Negative.    Respiratory: Negative.     Cardiovascular: Negative.    Gastrointestinal: Negative.    Genitourinary: Negative.    Musculoskeletal: Negative.    Neurological: Negative.        Objective   Physical Exam  Constitutional:       Appearance: Normal appearance.   HENT:      Head: Normocephalic.      Mouth/Throat:      Mouth: Mucous membranes are moist.   Eyes:      Extraocular Movements: Extraocular movements intact.      Pupils: Pupils are equal, round, and reactive to light.   Cardiovascular:      Rate and Rhythm: Normal rate and regular rhythm.   Pulmonary:      Effort: Pulmonary effort is normal.   Musculoskeletal:         General: Normal range of motion.   Skin:     General: Skin is warm and dry.   Neurological:      Mental Status: She is alert.         Assessment/Plan   Problem List Items Addressed This Visit    None  Visit Diagnoses         Codes    Physical exam    -  Primary Z00.00    Relevant Orders    Comprehensive Metabolic Panel    Lipid Panel    CBC                 Reginald Mensah DO 03/04/25 8:46 AM

## 2025-03-11 LAB
ALBUMIN SERPL-MCNC: 3.8 G/DL (ref 3.6–5.1)
ALP SERPL-CCNC: 74 U/L (ref 31–125)
ALT SERPL-CCNC: 18 U/L (ref 6–29)
ANION GAP SERPL CALCULATED.4IONS-SCNC: 10 MMOL/L (CALC) (ref 7–17)
AST SERPL-CCNC: 15 U/L (ref 10–30)
BILIRUB SERPL-MCNC: 0.2 MG/DL (ref 0.2–1.2)
BUN SERPL-MCNC: 12 MG/DL (ref 7–25)
CALCIUM SERPL-MCNC: 8.8 MG/DL (ref 8.6–10.2)
CHLORIDE SERPL-SCNC: 106 MMOL/L (ref 98–110)
CHOLEST SERPL-MCNC: 196 MG/DL
CHOLEST/HDLC SERPL: 5.2 (CALC)
CO2 SERPL-SCNC: 25 MMOL/L (ref 20–32)
CREAT SERPL-MCNC: 0.62 MG/DL (ref 0.5–0.96)
EGFRCR SERPLBLD CKD-EPI 2021: 128 ML/MIN/1.73M2
ERYTHROCYTE [DISTWIDTH] IN BLOOD BY AUTOMATED COUNT: 12.3 % (ref 11–15)
GLUCOSE SERPL-MCNC: 85 MG/DL (ref 65–99)
HCT VFR BLD AUTO: 39.5 % (ref 35–45)
HDLC SERPL-MCNC: 38 MG/DL
HGB BLD-MCNC: 12.9 G/DL (ref 11.7–15.5)
LDLC SERPL CALC-MCNC: 127 MG/DL (CALC)
MCH RBC QN AUTO: 29 PG (ref 27–33)
MCHC RBC AUTO-ENTMCNC: 32.7 G/DL (ref 32–36)
MCV RBC AUTO: 88.8 FL (ref 80–100)
NONHDLC SERPL-MCNC: 158 MG/DL (CALC)
PLATELET # BLD AUTO: 242 THOUSAND/UL (ref 140–400)
PMV BLD REES-ECKER: 10.6 FL (ref 7.5–12.5)
POTASSIUM SERPL-SCNC: 4.1 MMOL/L (ref 3.5–5.3)
PROT SERPL-MCNC: 6.6 G/DL (ref 6.1–8.1)
RBC # BLD AUTO: 4.45 MILLION/UL (ref 3.8–5.1)
SODIUM SERPL-SCNC: 141 MMOL/L (ref 135–146)
TRIGL SERPL-MCNC: 194 MG/DL
WBC # BLD AUTO: 7.1 THOUSAND/UL (ref 3.8–10.8)

## 2025-06-06 ENCOUNTER — OFFICE VISIT (OUTPATIENT)
Dept: PRIMARY CARE | Facility: CLINIC | Age: 25
End: 2025-06-06
Payer: COMMERCIAL

## 2025-06-06 VITALS
HEIGHT: 61 IN | BODY MASS INDEX: 47.95 KG/M2 | DIASTOLIC BLOOD PRESSURE: 77 MMHG | OXYGEN SATURATION: 98 % | SYSTOLIC BLOOD PRESSURE: 113 MMHG | WEIGHT: 254 LBS | HEART RATE: 80 BPM

## 2025-06-06 DIAGNOSIS — S33.6XXA SACROILIAC (LIGAMENT) SPRAIN, INITIAL ENCOUNTER: Primary | ICD-10-CM

## 2025-06-06 PROCEDURE — 1036F TOBACCO NON-USER: CPT | Performed by: FAMILY MEDICINE

## 2025-06-06 PROCEDURE — 3008F BODY MASS INDEX DOCD: CPT | Performed by: FAMILY MEDICINE

## 2025-06-06 PROCEDURE — 99214 OFFICE O/P EST MOD 30 MIN: CPT | Performed by: FAMILY MEDICINE

## 2025-06-06 RX ORDER — CYCLOBENZAPRINE HCL 10 MG
10 TABLET ORAL NIGHTLY PRN
Qty: 30 TABLET | Refills: 0 | Status: SHIPPED | OUTPATIENT
Start: 2025-06-06 | End: 2025-08-05

## 2025-06-06 RX ORDER — CELECOXIB 200 MG/1
200 CAPSULE ORAL DAILY
Qty: 30 CAPSULE | Refills: 5 | Status: SHIPPED | OUTPATIENT
Start: 2025-06-06 | End: 2025-12-03

## 2025-06-06 RX ORDER — LEVONORGESTREL 52 MG/1
52 INTRAUTERINE DEVICE INTRAUTERINE
COMMUNITY
Start: 2025-04-10

## 2025-06-06 ASSESSMENT — ENCOUNTER SYMPTOMS
ARTHRALGIAS: 1
PSYCHIATRIC NEGATIVE: 1
CARDIOVASCULAR NEGATIVE: 1
NEUROLOGICAL NEGATIVE: 1
MYALGIAS: 1
BACK PAIN: 1
RESPIRATORY NEGATIVE: 1
CONSTITUTIONAL NEGATIVE: 1

## 2025-06-06 NOTE — PROGRESS NOTES
Subjective   Patient ID: Ashley Felder is a 24 y.o. female who presents for Hip Pain and Back Pain.  HPI  Lbp si joint some pain in l bursa hip  Rom wnl nn/v intact  Review of Systems   Constitutional: Negative.    Respiratory: Negative.     Cardiovascular: Negative.    Musculoskeletal:  Positive for arthralgias, back pain and myalgias.   Neurological: Negative.    Psychiatric/Behavioral: Negative.         Objective   Physical Exam  General no acute process no icterus well-hydrated alert active oriented    HEENT normocephalic no palpable tenderness eyes pupils equal reactive light and accommodation extraocular muscles intact no icterus and/or erythema ears benign external auditory canal no gross deformities nose no discharge drainage erythema bleeding throat no erythema.    Heart regular rate and rhythm without S3-S4 or murmur    Lungs clear to auscultation x2 no rales or rhonchi    Abdomen soft nontender nondistended no palpable masses no organomegaly splenomegaly.    Integument no rash no lumps bumps or concerning lesions.    Neurologic no tics tremors or seizures no decreased range of motion or ataxia.    Musculoskeletal good range of motion no gross abnormalities noted    Decreased range of motion to lumbar spine neurovascular intact good heel toe walk strength is normal.  Assessment/Plan            Reginald Mensah DO 06/06/25 8:44 AM

## 2025-09-16 ENCOUNTER — APPOINTMENT (OUTPATIENT)
Dept: PRIMARY CARE | Facility: CLINIC | Age: 25
End: 2025-09-16
Payer: COMMERCIAL